# Patient Record
Sex: FEMALE | Race: WHITE | NOT HISPANIC OR LATINO | Employment: OTHER | URBAN - METROPOLITAN AREA
[De-identification: names, ages, dates, MRNs, and addresses within clinical notes are randomized per-mention and may not be internally consistent; named-entity substitution may affect disease eponyms.]

---

## 2017-01-11 ENCOUNTER — HOSPITAL ENCOUNTER (OUTPATIENT)
Dept: CARDIOLOGY | Facility: MEDICAL CENTER | Age: 72
End: 2017-01-11
Attending: INTERNAL MEDICINE
Payer: MEDICARE

## 2017-01-11 DIAGNOSIS — I20.9 ISCHEMIC CHEST PAIN (HCC): ICD-10-CM

## 2017-01-11 PROCEDURE — 93017 CV STRESS TEST TRACING ONLY: CPT

## 2017-01-11 PROCEDURE — A9555 RB82 RUBIDIUM: HCPCS

## 2017-01-11 PROCEDURE — 78492 MYOCRD IMG PET MLT RST&STRS: CPT

## 2017-01-11 PROCEDURE — 700111 HCHG RX REV CODE 636 W/ 250 OVERRIDE (IP)

## 2017-01-12 NOTE — PROCEDURES
REFERRING PHYSICIAN:  Fernando Tesfaye M.D.    AGE:  71.  GENDER:  Female.  HEIGHT:  5 feet 4 inches.  WEIGHT:  175.  BMI:    INDICATIONS:  Chest pain.    MEDICATIONS:    PROCEDURE:  The patient reviewed and signed the acknowledgement for testing   form.  The patient was in a fasting state and was properly prepared for   testing.  An intravenous line was inserted and a flush of normal saline   followed to insure line patency.    A transmission scan was acquired for attenuation correction using the internal   Germanium sources.  The patient was then administered 3.12 mCi of   Rubidium-82.  Approximately 90 seconds after the infusion, resting imagine   were obtained with ECG-gating.  Following the resting series, the patient   administered 45 mg of dipyridamole over four minutes.  The blood pressure,   heart rate and ECG were monitored and recorded.  After the dipyridamole   infusion was completed, another transmission scan for attenuation correction   was obtained.  The patient was then administered 3.0 mCi of Rubidium-82.    Approximately 90 seconds after the infusion, Peak stress images were obtained   with ECG-gating.    CLINICAL RESPONSE:  Resting blood pressure was 158/87 with a heart rate of 64.    Immediately post-dipyridamole infusion the blood pressure was 134/65 with a   heart rate of 93.  After a recovery period the blood pressure was 135/77 with   a heart rate of 91.    The patient experienced headache and jaw pain, no symptoms during testing.    Aminophylline 100 mg was administered following the scan.    ELECTROCARDIOGRAPHIC FINDINGS:  The patient has normal sinus rhythm at rest.    With dipyridamole infusion, there were no ischemic changes.  Occasional PVC   noted.    SCINTOGRAPHIC FINDINGS:  With both stress and rest imaging, there is normal   myocardial perfusion.  No evidence of ischemia or infarction.    GATED WALL MOTION FINDINGS:  By gated PET, there is normal regional wall   motion.  The  measured ejection fraction at rest is 78%.    CONCLUSIONS AND IMPRESSIONS:  Negative PET perfusion study for ischemia.    Normal resting ejection fraction.       ____________________________________     MD RITESH Valle / DILIP    DD:  01/11/2017 17:39:00  DT:  01/11/2017 18:48:40    D#:  [id number]  Job#:  [id number]    cc: TIBURCIO GAMEZ MD, XANDER HAINES MD

## 2017-01-16 ENCOUNTER — TELEPHONE (OUTPATIENT)
Dept: CARDIOLOGY | Facility: MEDICAL CENTER | Age: 72
End: 2017-01-16

## 2017-01-16 NOTE — TELEPHONE ENCOUNTER
NM-CARDIAC PET   Status: Final result     Visible to patient:  Not Released     Dx:  Ischemic chest pain (CMS-HCC)               Notes Recorded by Fernando Tesfaye M.D. on 1/13/2017 at 11:13 AM  Negative stress test, looks good  Notes Recorded by Kristi Vásquez R.N. on 1/13/2017 at 10:18 AM  F/V with ROSIE Santoyo th 18th     To discuss in follow up as planned 1/18

## 2017-01-16 NOTE — TELEPHONE ENCOUNTER
DONE    ----- Message from Fernando Tesfaye M.D. sent at 1/13/2017 11:13 AM PST -----  Negative stress test, looks good

## 2017-01-17 ENCOUNTER — TELEPHONE (OUTPATIENT)
Dept: CARDIOLOGY | Facility: MEDICAL CENTER | Age: 72
End: 2017-01-17

## 2017-01-17 NOTE — TELEPHONE ENCOUNTER
----- Message from RADHA Smyth sent at 1/13/2017 11:54 AM PST -----  Please let patient know that RO reviewed PET and is normal. FU as planned. SC  ----- Message -----     From: Kristi Vásquez R.N.     Sent: 1/13/2017  10:18 AM       To: Fernando Tesfaye M.D., #    F/V with ROSIE Santoyo th 18th

## 2017-01-18 ENCOUNTER — OFFICE VISIT (OUTPATIENT)
Dept: CARDIOLOGY | Facility: MEDICAL CENTER | Age: 72
End: 2017-01-18
Payer: MEDICARE

## 2017-01-18 VITALS
HEIGHT: 64 IN | WEIGHT: 174 LBS | OXYGEN SATURATION: 96 % | HEART RATE: 84 BPM | SYSTOLIC BLOOD PRESSURE: 126 MMHG | BODY MASS INDEX: 29.71 KG/M2 | DIASTOLIC BLOOD PRESSURE: 84 MMHG

## 2017-01-18 DIAGNOSIS — E78.00 PURE HYPERCHOLESTEROLEMIA: ICD-10-CM

## 2017-01-18 DIAGNOSIS — I20.9 ISCHEMIC CHEST PAIN (HCC): ICD-10-CM

## 2017-01-18 DIAGNOSIS — R94.31 PROLONGED Q-T INTERVAL ON ECG: ICD-10-CM

## 2017-01-18 DIAGNOSIS — I10 ESSENTIAL HYPERTENSION: ICD-10-CM

## 2017-01-18 PROCEDURE — 99214 OFFICE O/P EST MOD 30 MIN: CPT | Performed by: NURSE PRACTITIONER

## 2017-01-18 RX ORDER — CLARITHROMYCIN 500 MG/1
TABLET, COATED ORAL
Refills: 0 | COMMUNITY
Start: 2016-11-29 | End: 2017-01-18

## 2017-01-18 ASSESSMENT — ENCOUNTER SYMPTOMS
CLAUDICATION: 0
PALPITATIONS: 0
SHORTNESS OF BREATH: 0
DIZZINESS: 0
ABDOMINAL PAIN: 0
PND: 0
FEVER: 0
MYALGIAS: 0
COUGH: 0
ORTHOPNEA: 0

## 2017-01-18 NOTE — Clinical Note
[facility]   [address], [address]  [place] [address]  Phone: [phone]  Fax: [phone]              Tessa Oro  1945    Encounter Date: 1/18/2017    RADHA Smyth          PROGRESS NOTE:  Subjective:   Tessa Oro is a 71 y.o. female who presents today for follow up regarding stress imaging.    She is a patient of Dr. Tesfaye in our office. Hx of HTN, HLD, and atypical chest pains.    She is overall doing well. She continues to stay active with water aerobics daily. She has no chest discomfort with exertion but has some atypical chest tightness at rest that is very rare and lasts only a few seconds.    She has had no episodes of palpitations, dizziness/lightheadedness, shortness of breath, orthopnea, or peripheral edema.    Past Medical History   Diagnosis Date   • Hypertension    • Hyperlipidemia    • Fibromyalgia    • Hypothyroid    • GERD (gastroesophageal reflux disease)    • Urinary frequency      History reviewed. No pertinent past surgical history.  History reviewed. No pertinent family history.  History   Smoking status   • Former Smoker -- 0.50 packs/day for 15 years   • Types: Cigarettes   • Quit date: 12/16/1975   Smokeless tobacco   • Never Used     Allergies   Allergen Reactions   • Pcn [Penicillins] Hives   • Sulfa Drugs      Pt. States unsure of reactions and severity. Had the allergic reaction a while ago   • Tetracycline Hives     Outpatient Encounter Prescriptions as of 1/18/2017   Medication Sig Dispense Refill   • losartan (COZAAR) 100 MG Tab Take 100 mg by mouth every day.     • levothyroxine (SYNTHROID) 125 MCG Tab Take 125 mcg by mouth Every morning on an empty stomach.     • omeprazole (PRILOSEC) 20 MG delayed-release capsule Take 20 mg by mouth every day.     • oxybutynin SR (DITROPAN-XL) 10 MG CR tablet Take 10 mg by mouth every day.     • atorvastatin (LIPITOR) 40 MG Tab Take 40 mg by mouth  "every evening.     • cyclobenzaprine (FLEXERIL) 10 MG Tab Take 10 mg by mouth 3 times a day as needed.     • amlodipine (NORVASC) 2.5 MG Tab Take 2.5 mg by mouth every day.     • duloxetine (CYMBALTA) 30 MG Cap DR Particles Take 30 mg by mouth every day.     • [DISCONTINUED] clarithromycin (BIAXIN) 500 MG Tab TAKE 1 TABLET ORALLY TWICE DAILY WITH FOOD AND PROBIOTICS  0     No facility-administered encounter medications on file as of 1/18/2017.     Review of Systems   Constitutional: Negative for fever and malaise/fatigue.   Respiratory: Negative for cough and shortness of breath.    Cardiovascular: Positive for chest pain. Negative for palpitations, orthopnea, claudication, leg swelling and PND.        Chest tightness at rest but very rare   Gastrointestinal: Negative for abdominal pain.   Musculoskeletal: Negative for myalgias.   Neurological: Negative for dizziness.   All other systems reviewed and are negative.       Objective:   /84 mmHg  Pulse 84  Ht 1.626 m (5' 4.02\")  Wt 78.926 kg (174 lb)  BMI 29.85 kg/m2  SpO2 96%    Physical Exam   Constitutional: She is oriented to person, place, and time. She appears well-developed and well-nourished. No distress.   HENT:   Head: Normocephalic and atraumatic.   Eyes: EOM are normal.   Neck: Normal range of motion. No JVD present.   Cardiovascular: Normal rate, regular rhythm, normal heart sounds and intact distal pulses.    No murmur heard.  Pulmonary/Chest: Effort normal and breath sounds normal. No respiratory distress.   Abdominal: Soft. Bowel sounds are normal.   Musculoskeletal: Normal range of motion. She exhibits no edema.   Neurological: She is alert and oriented to person, place, and time.   Skin: Skin is warm and dry.   Psychiatric: She has a normal mood and affect.   Nursing note and vitals reviewed.    No results found for: CHOLSTRLTOT, LDL, HDL, TRIGLYCERIDE    No results found for: SODIUM, POTASSIUM, CHLORIDE, CO2, GLUCOSE, BUN, CREATININE, " BUNCREATRAT, GLOMRATE  No results found for: ALKPHOSPHAT, ASTSGOT, ALTSGPT, TBILIRUBIN     2017 PET SCAN CONCLUSIONS AND IMPRESSIONS:  Negative PET perfusion study for ischemia.     Normal resting ejection fraction.     LABS THROUGH LAB [facility]A  Assessment:     1. Essential hypertension     2. Pure hypercholesterolemia     3. Ischemic chest pain (CMS-HCC)     4. Prolonged Q-T interval on ECG       Medical Decision Making:  Today's Assessment / Status / Plan:     1. HTN, great control on losartan and amlodipine. Continue management with PCP.    2. HLD, great control with lipitor. Continue management with PCP.    3. Chest pain, atypical and now with negative PET scan. Re-assured patient that this is most likely not cardiac related and to follow up with PCP if continues for other causes. She is to continue ASA and lipitor.    4. Prolonged QT, not identified by 2 EKG's at last apt by Dr. Tesfaye.     FU in clinic PRN for any cardiac concerns    Patient verbalizes understanding and agrees with the plan of care.     Collaborating MD: Best HENRY    Spent 45 minutes in face-to-face patient contact in which greater than 50% of the visit was spent in counseling/coordination of care of medication management, symptom management, re-assurance, discussion of chest pain and negative PET scan.        No Recipients

## 2017-01-18 NOTE — MR AVS SNAPSHOT
"        Tessa Hoffmanon   2017 1:20 PM   Office Visit   MRN: [id number]    Department:  [facility]   Dept Phone:  [phone]    Description:  Female : 1945   Provider:  RADHA Smyth           Reason for Visit     Follow-Up PP of RO       Allergies as of 2017     Allergen Noted Reactions    Pcn [Penicillins] 2016   Hives    Sulfa Drugs 2016       Pt. States unsure of reactions and severity. Had the allergic reaction a while ago    Tetracycline 2016   Hives      You were diagnosed with     Essential hypertension   [4468510]       Pure hypercholesterolemia   [272.0.ICD-9-CM]       Ischemic chest pain (CMS-HCC)   [360908]       Prolonged Q-T interval on ECG   [491586]         Vital Signs     Blood Pressure Pulse Height Weight Body Mass Index Oxygen Saturation    126/84 mmHg 84 1.626 m (5' 4.02\") 78.926 kg (174 lb) 29.85 kg/m2 96%    Smoking Status                   Former Smoker           Basic Information     Date Of Birth Sex Race Ethnicity Preferred Language    1945 Female White Non- English      Problem List              ICD-10-CM Priority Class Noted - Resolved    Prolonged Q-T interval on ECG R94.31 Medium  2016 - Present    Essential hypertension I10 Medium  2016 - Present    Pure hypercholesterolemia E78.00 Medium  2016 - Present    Ischemic chest pain (CMS-HCC) I20.9 Medium  2016 - Present      Health Maintenance        Date Due Completion Dates    IMM DTaP/Tdap/Td Vaccine (1 - Tdap) 1964 ---    PAP SMEAR 1966 ---    MAMMOGRAM 1985 ---    COLONOSCOPY 1995 ---    IMM ZOSTER VACCINE 2005 ---    BONE DENSITY 2010 ---    IMM PNEUMOCOCCAL 65+ (ADULT) LOW/MEDIUM RISK SERIES (1 of 2 - PCV13) 2010 ---    IMM INFLUENZA (1) 2016 ---            Current Immunizations     No immunizations on file.      Below and/or attached are the medications your provider expects you to take. Review " all of your home medications and newly ordered medications with your provider and/or pharmacist. Follow medication instructions as directed by your provider and/or pharmacist. Please keep your medication list with you and share with your provider. Update the information when medications are discontinued, doses are changed, or new medications (including over-the-counter products) are added; and carry medication information at all times in the event of emergency situations     Allergies:  PCN - Hives     SULFA DRUGS - (reactions not documented)     TETRACYCLINE - Hives               Medications  Valid as of: January 18, 2017 -  1:39 PM    Generic Name Brand Name Tablet Size Instructions for use    AmLODIPine Besylate (Tab) NORVASC 2.5 MG Take 2.5 mg by mouth every day.        Atorvastatin Calcium (Tab) LIPITOR 40 MG Take 40 mg by mouth every evening.        Cyclobenzaprine HCl (Tab) FLEXERIL 10 MG Take 10 mg by mouth 3 times a day as needed.        DULoxetine HCl (Cap DR Particles) CYMBALTA 30 MG Take 30 mg by mouth every day.        Levothyroxine Sodium (Tab) SYNTHROID 125 MCG Take 125 mcg by mouth Every morning on an empty stomach.        Losartan Potassium (Tab) COZAAR 100 MG Take 100 mg by mouth every day.        Omeprazole (CAPSULE DELAYED RELEASE) PRILOSEC 20 MG Take 20 mg by mouth every day.        Oxybutynin Chloride (TABLET SR 24 HR) DITROPAN-XL 10 MG Take 10 mg by mouth every day.        .                 Medicines prescribed today were sent to:     "[facility] [address] [place] [address] & [address] [address]    Phone: [phone] Fax: [phone]    Open 24 Hours?: No      Medication refill instructions:       If your prescription bottle indicates you have medication refills left, it is not necessary to call your provider’s office. Please contact your pharmacy and they will refill your medication.    If your prescription bottle indicates  you do not have any refills left, you may request refills at any time through one of the following ways: The online Nutrinsic system (except Urgent Care), by calling your provider’s office, or by asking your pharmacy to contact your provider’s office with a refill request. Medication refills are processed only during regular business hours and may not be available until the next business day. Your provider may request additional information or to have a follow-up visit with you prior to refilling your medication.   *Please Note: Medication refills are assigned a new Rx number when refilled electronically. Your pharmacy may indicate that no refills were authorized even though a new prescription for the same medication is available at the pharmacy. Please request the medicine by name with the pharmacy before contacting your provider for a refill.           Nutrinsic Access Code: [email or web address]  Expires: 2/17/2017  1:39 PM    Nutrinsic  A secure, online tool to manage your health information     [employer]’s Nutrinsic® is a secure, online tool that connects you to your personalized health information from the privacy of your home -- day or night - making it very easy for you to manage your healthcare. Once the activation process is completed, you can even access your medical information using the Nutrinsic erna, which is available for free in the Apple Erna store or Google Play store.     Nutrinsic provides the following levels of access (as shown below):   My Chart Features   [facility] Doctor [facility]  Urgent  Care Non-[facility]  Primary Care  Doctor   Email your healthcare team securely and privately 24/7 X X X    Manage appointments: schedule your next appointment; view details of past/upcoming appointments X      Request prescription refills. X      View recent personal medical records, including lab and immunizations X X X X   View health record, including health history, allergies, medications X  X X X   Read reports about your outpatient visits, procedures, consult and ER notes X X X X   See your discharge summary, which is a recap of your hospital and/or ER visit that includes your diagnosis, lab results, and care plan. X X       How to register for EcoSynth:  1. Go to  [email or web address].  2. Click on the Sign Up Now box, which takes you to the New Member Sign Up page. You will need to provide the following information:  a. Enter your EcoSynth Access Code exactly as it appears at the top of this page. (You will not need to use this code after you’ve completed the sign-up process. If you do not sign up before the expiration date, you must request a new code.)   b. Enter your date of birth.   c. Enter your home email address.   d. Click Submit, and follow the next screen’s instructions.  3. Create a EcoSynth ID. This will be your EcoSynth login ID and cannot be changed, so think of one that is secure and easy to remember.  4. Create a EcoSynth password. You can change your password at any time.  5. Enter your Password Reset Question and Answer. This can be used at a later time if you forget your password.   6. Enter your e-mail address. This allows you to receive e-mail notifications when new information is available in EcoSynth.  7. Click Sign Up. You can now view your health information.    For assistance activating your EcoSynth account, call [phone]

## 2017-01-18 NOTE — PROGRESS NOTES
Subjective:   Tessa Oro is a 71 y.o. female who presents today for follow up regarding stress imaging.    She is a patient of Dr. Tesfaye in our office. Hx of HTN, HLD, and atypical chest pains.    She is overall doing well. She continues to stay active with water aerobics daily. She has no chest discomfort with exertion but has some atypical chest tightness at rest that is very rare and lasts only a few seconds.    She has had no episodes of palpitations, dizziness/lightheadedness, shortness of breath, orthopnea, or peripheral edema.    Past Medical History   Diagnosis Date   • Hypertension    • Hyperlipidemia    • Fibromyalgia    • Hypothyroid    • GERD (gastroesophageal reflux disease)    • Urinary frequency      History reviewed. No pertinent past surgical history.  History reviewed. No pertinent family history.  History   Smoking status   • Former Smoker -- 0.50 packs/day for 15 years   • Types: Cigarettes   • Quit date: 12/16/1975   Smokeless tobacco   • Never Used     Allergies   Allergen Reactions   • Pcn [Penicillins] Hives   • Sulfa Drugs      Pt. States unsure of reactions and severity. Had the allergic reaction a while ago   • Tetracycline Hives     Outpatient Encounter Prescriptions as of 1/18/2017   Medication Sig Dispense Refill   • losartan (COZAAR) 100 MG Tab Take 100 mg by mouth every day.     • levothyroxine (SYNTHROID) 125 MCG Tab Take 125 mcg by mouth Every morning on an empty stomach.     • omeprazole (PRILOSEC) 20 MG delayed-release capsule Take 20 mg by mouth every day.     • oxybutynin SR (DITROPAN-XL) 10 MG CR tablet Take 10 mg by mouth every day.     • atorvastatin (LIPITOR) 40 MG Tab Take 40 mg by mouth every evening.     • cyclobenzaprine (FLEXERIL) 10 MG Tab Take 10 mg by mouth 3 times a day as needed.     • amlodipine (NORVASC) 2.5 MG Tab Take 2.5 mg by mouth every day.     • duloxetine (CYMBALTA) 30 MG Cap DR Particles Take 30 mg by mouth every day.     • [DISCONTINUED]  "clarithromycin (BIAXIN) 500 MG Tab TAKE 1 TABLET ORALLY TWICE DAILY WITH FOOD AND PROBIOTICS  0     No facility-administered encounter medications on file as of 1/18/2017.     Review of Systems   Constitutional: Negative for fever and malaise/fatigue.   Respiratory: Negative for cough and shortness of breath.    Cardiovascular: Positive for chest pain. Negative for palpitations, orthopnea, claudication, leg swelling and PND.        Chest tightness at rest but very rare   Gastrointestinal: Negative for abdominal pain.   Musculoskeletal: Negative for myalgias.   Neurological: Negative for dizziness.   All other systems reviewed and are negative.       Objective:   /84 mmHg  Pulse 84  Ht 1.626 m (5' 4.02\")  Wt 78.926 kg (174 lb)  BMI 29.85 kg/m2  SpO2 96%    Physical Exam   Constitutional: She is oriented to person, place, and time. She appears well-developed and well-nourished. No distress.   HENT:   Head: Normocephalic and atraumatic.   Eyes: EOM are normal.   Neck: Normal range of motion. No JVD present.   Cardiovascular: Normal rate, regular rhythm, normal heart sounds and intact distal pulses.    No murmur heard.  Pulmonary/Chest: Effort normal and breath sounds normal. No respiratory distress.   Abdominal: Soft. Bowel sounds are normal.   Musculoskeletal: Normal range of motion. She exhibits no edema.   Neurological: She is alert and oriented to person, place, and time.   Skin: Skin is warm and dry.   Psychiatric: She has a normal mood and affect.   Nursing note and vitals reviewed.    No results found for: CHOLSTRLTOT, LDL, HDL, TRIGLYCERIDE    No results found for: SODIUM, POTASSIUM, CHLORIDE, CO2, GLUCOSE, BUN, CREATININE, BUNCREATRAT, GLOMRATE  No results found for: ALKPHOSPHAT, ASTSGOT, ALTSGPT, TBILIRUBIN     2017 PET SCAN CONCLUSIONS AND IMPRESSIONS:  Negative PET perfusion study for ischemia.     Normal resting ejection fraction.     LABS THROUGH LAB [facility]ITHA  Assessment:     1. Essential " hypertension     2. Pure hypercholesterolemia     3. Ischemic chest pain (CMS-HCC)     4. Prolonged Q-T interval on ECG       Medical Decision Making:  Today's Assessment / Status / Plan:     1. HTN, great control on losartan and amlodipine. Continue management with PCP.    2. HLD, great control with lipitor. Continue management with PCP.    3. Chest pain, atypical and now with negative PET scan. Re-assured patient that this is most likely not cardiac related and to follow up with PCP if continues for other causes. She is to continue ASA and lipitor.    4. Prolonged QT, not identified by 2 EKG's at last apt by Dr. Tesfaye.     FU in clinic PRN for any cardiac concerns    Patient verbalizes understanding and agrees with the plan of care.     Collaborating MD: Best HENRY    Spent 45 minutes in face-to-face patient contact in which greater than 50% of the visit was spent in counseling/coordination of care of medication management, symptom management, re-assurance, discussion of chest pain and negative PET scan.

## 2017-11-14 ENCOUNTER — APPOINTMENT (RX ONLY)
Dept: URBAN - METROPOLITAN AREA CLINIC 4 | Facility: CLINIC | Age: 72
Setting detail: DERMATOLOGY
End: 2017-11-14

## 2017-11-14 DIAGNOSIS — L82.1 OTHER SEBORRHEIC KERATOSIS: ICD-10-CM

## 2017-11-14 DIAGNOSIS — L81.4 OTHER MELANIN HYPERPIGMENTATION: ICD-10-CM

## 2017-11-14 DIAGNOSIS — D17 BENIGN LIPOMATOUS NEOPLASM: ICD-10-CM

## 2017-11-14 DIAGNOSIS — D22 MELANOCYTIC NEVI: ICD-10-CM

## 2017-11-14 DIAGNOSIS — D18.0 HEMANGIOMA: ICD-10-CM

## 2017-11-14 PROBLEM — D22.9 MELANOCYTIC NEVI, UNSPECIFIED: Status: ACTIVE | Noted: 2017-11-14

## 2017-11-14 PROBLEM — I10 ESSENTIAL (PRIMARY) HYPERTENSION: Status: ACTIVE | Noted: 2017-11-14

## 2017-11-14 PROBLEM — E78.5 HYPERLIPIDEMIA, UNSPECIFIED: Status: ACTIVE | Noted: 2017-11-14

## 2017-11-14 PROBLEM — D18.01 HEMANGIOMA OF SKIN AND SUBCUTANEOUS TISSUE: Status: ACTIVE | Noted: 2017-11-14

## 2017-11-14 PROBLEM — E03.9 HYPOTHYROIDISM, UNSPECIFIED: Status: ACTIVE | Noted: 2017-11-14

## 2017-11-14 PROBLEM — D17.21 BENIGN LIPOMATOUS NEOPLASM OF SKIN AND SUBCUTANEOUS TISSUE OF RIGHT ARM: Status: ACTIVE | Noted: 2017-11-14

## 2017-11-14 PROCEDURE — 99203 OFFICE O/P NEW LOW 30 MIN: CPT

## 2017-11-14 PROCEDURE — ? COUNSELING

## 2017-11-14 ASSESSMENT — LOCATION ZONE DERM
LOCATION ZONE: ARM
LOCATION ZONE: LIP

## 2017-11-14 ASSESSMENT — LOCATION SIMPLE DESCRIPTION DERM
LOCATION SIMPLE: RIGHT SHOULDER
LOCATION SIMPLE: LEFT LIP

## 2017-11-14 ASSESSMENT — LOCATION DETAILED DESCRIPTION DERM
LOCATION DETAILED: LEFT SUPERIOR VERMILION LIP
LOCATION DETAILED: RIGHT POSTERIOR SHOULDER

## 2017-12-20 ENCOUNTER — HOSPITAL ENCOUNTER (OUTPATIENT)
Dept: HOSPITAL 8 - CFH | Age: 72
Discharge: HOME | End: 2017-12-20
Attending: FAMILY MEDICINE
Payer: MEDICARE

## 2017-12-20 DIAGNOSIS — Z12.31: Primary | ICD-10-CM

## 2017-12-20 DIAGNOSIS — N80.9: ICD-10-CM

## 2017-12-20 PROCEDURE — 77063 BREAST TOMOSYNTHESIS BI: CPT

## 2017-12-20 PROCEDURE — G0202 SCR MAMMO BI INCL CAD: HCPCS

## 2017-12-25 ENCOUNTER — APPOINTMENT (OUTPATIENT)
Dept: RADIOLOGY | Facility: MEDICAL CENTER | Age: 72
End: 2017-12-25
Attending: EMERGENCY MEDICINE
Payer: MEDICARE

## 2017-12-25 ENCOUNTER — HOSPITAL ENCOUNTER (OUTPATIENT)
Facility: MEDICAL CENTER | Age: 72
End: 2017-12-25
Attending: EMERGENCY MEDICINE | Admitting: HOSPITALIST
Payer: MEDICARE

## 2017-12-25 ENCOUNTER — RESOLUTE PROFESSIONAL BILLING HOSPITAL PROF FEE (OUTPATIENT)
Dept: HOSPITALIST | Facility: MEDICAL CENTER | Age: 72
End: 2017-12-25
Payer: MEDICARE

## 2017-12-25 VITALS
OXYGEN SATURATION: 97 % | BODY MASS INDEX: 29.88 KG/M2 | DIASTOLIC BLOOD PRESSURE: 72 MMHG | SYSTOLIC BLOOD PRESSURE: 111 MMHG | TEMPERATURE: 98.1 F | HEART RATE: 77 BPM | HEIGHT: 64 IN | WEIGHT: 175 LBS | RESPIRATION RATE: 8 BRPM

## 2017-12-25 DIAGNOSIS — R07.2 PRECORDIAL PAIN: ICD-10-CM

## 2017-12-25 PROBLEM — R07.9 CHEST PAIN: Status: RESOLVED | Noted: 2017-12-25 | Resolved: 2017-12-25

## 2017-12-25 PROBLEM — R11.2 NAUSEA AND VOMITING: Status: ACTIVE | Noted: 2017-12-25

## 2017-12-25 PROBLEM — R11.2 NAUSEA AND VOMITING: Status: RESOLVED | Noted: 2017-12-25 | Resolved: 2017-12-25

## 2017-12-25 PROBLEM — R07.9 CHEST PAIN: Status: ACTIVE | Noted: 2017-12-25

## 2017-12-25 LAB
ALBUMIN SERPL BCP-MCNC: 4.1 G/DL (ref 3.2–4.9)
ALBUMIN/GLOB SERPL: 1.4 G/DL
ALP SERPL-CCNC: 32 U/L (ref 30–99)
ALT SERPL-CCNC: 19 U/L (ref 2–50)
ANION GAP SERPL CALC-SCNC: 10 MMOL/L (ref 0–11.9)
APTT PPP: 25.1 SEC (ref 24.7–36)
AST SERPL-CCNC: 20 U/L (ref 12–45)
BASOPHILS # BLD AUTO: 0.3 % (ref 0–1.8)
BASOPHILS # BLD: 0.03 K/UL (ref 0–0.12)
BILIRUB SERPL-MCNC: 0.7 MG/DL (ref 0.1–1.5)
BNP SERPL-MCNC: 18 PG/ML (ref 0–100)
BUN SERPL-MCNC: 16 MG/DL (ref 8–22)
CALCIUM SERPL-MCNC: 9 MG/DL (ref 8.5–10.5)
CHLORIDE SERPL-SCNC: 105 MMOL/L (ref 96–112)
CO2 SERPL-SCNC: 20 MMOL/L (ref 20–33)
CREAT SERPL-MCNC: 0.87 MG/DL (ref 0.5–1.4)
EKG IMPRESSION: NORMAL
EOSINOPHIL # BLD AUTO: 0.01 K/UL (ref 0–0.51)
EOSINOPHIL NFR BLD: 0.1 % (ref 0–6.9)
ERYTHROCYTE [DISTWIDTH] IN BLOOD BY AUTOMATED COUNT: 40.4 FL (ref 35.9–50)
GFR SERPL CREATININE-BSD FRML MDRD: >60 ML/MIN/1.73 M 2
GLOBULIN SER CALC-MCNC: 3 G/DL (ref 1.9–3.5)
GLUCOSE SERPL-MCNC: 131 MG/DL (ref 65–99)
HCT VFR BLD AUTO: 41.7 % (ref 37–47)
HGB BLD-MCNC: 14.2 G/DL (ref 12–16)
IMM GRANULOCYTES # BLD AUTO: 0.03 K/UL (ref 0–0.11)
IMM GRANULOCYTES NFR BLD AUTO: 0.3 % (ref 0–0.9)
INR PPP: 1.05 (ref 0.87–1.13)
LIPASE SERPL-CCNC: 11 U/L (ref 11–82)
LYMPHOCYTES # BLD AUTO: 0.77 K/UL (ref 1–4.8)
LYMPHOCYTES NFR BLD: 7.4 % (ref 22–41)
MCH RBC QN AUTO: 29.7 PG (ref 27–33)
MCHC RBC AUTO-ENTMCNC: 34.1 G/DL (ref 33.6–35)
MCV RBC AUTO: 87.2 FL (ref 81.4–97.8)
MONOCYTES # BLD AUTO: 0.46 K/UL (ref 0–0.85)
MONOCYTES NFR BLD AUTO: 4.4 % (ref 0–13.4)
NEUTROPHILS # BLD AUTO: 9.16 K/UL (ref 2–7.15)
NEUTROPHILS NFR BLD: 87.5 % (ref 44–72)
NRBC # BLD AUTO: 0 K/UL
NRBC BLD-RTO: 0 /100 WBC
PLATELET # BLD AUTO: 354 K/UL (ref 164–446)
PMV BLD AUTO: 9.3 FL (ref 9–12.9)
POTASSIUM SERPL-SCNC: 3.5 MMOL/L (ref 3.6–5.5)
PROT SERPL-MCNC: 7.1 G/DL (ref 6–8.2)
PROTHROMBIN TIME: 13.4 SEC (ref 12–14.6)
RBC # BLD AUTO: 4.78 M/UL (ref 4.2–5.4)
SODIUM SERPL-SCNC: 135 MMOL/L (ref 135–145)
TROPONIN I SERPL-MCNC: <0.01 NG/ML (ref 0–0.04)
TROPONIN I SERPL-MCNC: <0.01 NG/ML (ref 0–0.04)
WBC # BLD AUTO: 10.5 K/UL (ref 4.8–10.8)

## 2017-12-25 PROCEDURE — 85025 COMPLETE CBC W/AUTO DIFF WBC: CPT

## 2017-12-25 PROCEDURE — 93005 ELECTROCARDIOGRAM TRACING: CPT | Performed by: EMERGENCY MEDICINE

## 2017-12-25 PROCEDURE — 71010 DX-CHEST-PORTABLE (1 VIEW): CPT

## 2017-12-25 PROCEDURE — 99285 EMERGENCY DEPT VISIT HI MDM: CPT

## 2017-12-25 PROCEDURE — 80053 COMPREHEN METABOLIC PANEL: CPT

## 2017-12-25 PROCEDURE — 36415 COLL VENOUS BLD VENIPUNCTURE: CPT

## 2017-12-25 PROCEDURE — 85730 THROMBOPLASTIN TIME PARTIAL: CPT

## 2017-12-25 PROCEDURE — 83880 ASSAY OF NATRIURETIC PEPTIDE: CPT

## 2017-12-25 PROCEDURE — 84484 ASSAY OF TROPONIN QUANT: CPT

## 2017-12-25 PROCEDURE — G0378 HOSPITAL OBSERVATION PER HR: HCPCS

## 2017-12-25 PROCEDURE — 83690 ASSAY OF LIPASE: CPT

## 2017-12-25 PROCEDURE — 93005 ELECTROCARDIOGRAM TRACING: CPT

## 2017-12-25 PROCEDURE — 85610 PROTHROMBIN TIME: CPT

## 2017-12-25 PROCEDURE — 99220 PR INITIAL OBSERVATION CARE,LEVL III: CPT | Performed by: HOSPITALIST

## 2017-12-25 RX ORDER — SODIUM CHLORIDE 9 MG/ML
INJECTION, SOLUTION INTRAVENOUS CONTINUOUS
Status: DISCONTINUED | OUTPATIENT
Start: 2017-12-25 | End: 2017-12-25 | Stop reason: HOSPADM

## 2017-12-25 ASSESSMENT — ENCOUNTER SYMPTOMS
PALPITATIONS: 0
COUGH: 0
NAUSEA: 1
DIARRHEA: 0
FOCAL WEAKNESS: 0
CONSTIPATION: 0
ABDOMINAL PAIN: 1
DIZZINESS: 0
MYALGIAS: 0
CHILLS: 0
WEAKNESS: 0
FEVER: 0
SHORTNESS OF BREATH: 0
HEADACHES: 0
VOMITING: 1

## 2017-12-25 ASSESSMENT — PAIN SCALES - GENERAL: PAINLEVEL_OUTOF10: 3

## 2017-12-25 NOTE — ASSESSMENT & PLAN NOTE
This is resolved now. I suspect she may have had some bad food. Her labs are benign. I do not a high concern for a life-threatening infection, dissection, cholecystitis, acute MI.

## 2017-12-25 NOTE — ED NOTES
MD Head at bed for consult/ POC to d/c pt post neg Troponin/ekg. Pt aware of POC. Pt given discharge instructions/ home care instructions explained/ pt understands the need for follow up, pt verbalized understanding of instructions given, pt ambulatory to [facility].

## 2017-12-25 NOTE — CONSULTS
[facility] Medicine ER Consultation    Date of Service  12/25/2017    Reason for Consultation  Chest pain     History of Presenting Illness  72 y.o. female who presented 12/25/2017 with chest pain that started yesterday afternoon. She had gone to [facility], but then she started having some nausea and vomiting. Her chest pain started after she had been vomiting several times. She describes her chest pain is left upper quadrant of the abdomen and left lower chest, that was sore and crampy. This pain lasted approximately 6 hours and was constant. It is completely resolved at the moment her nausea is also completely resolved. On reviewing her records, she had a normal stress test on January 11, 2017. She also had an EKG done in December of last year, which I compared this EKG, and is essentially unchanged except for some flipped T waves in lead II.    Referring Physician  Billy Swanson M.D.    Consulting Physician  Kedar Head M.D.    Review of Systems  Review of Systems   Constitutional: Negative for chills and fever.   Respiratory: Negative for cough and shortness of breath.    Cardiovascular: Positive for chest pain. Negative for palpitations.   Gastrointestinal: Positive for abdominal pain, nausea and vomiting. Negative for constipation and diarrhea.   Genitourinary: Negative for dysuria.   Musculoskeletal: Negative for myalgias.   Skin: Negative for itching.   Neurological: Negative for dizziness, focal weakness, weakness and headaches.   All other systems reviewed and are negative.     Past Medical History  Past Medical History:   Diagnosis Date   • Fibromyalgia    • GERD (gastroesophageal reflux disease)    • Hyperlipidemia    • Hypertension    • Hypothyroid    • Urinary frequency        Surgical History  No past surgical history on file.    Medications  No current facility-administered medications on file prior to encounter.      Current Outpatient Prescriptions on File Prior to Encounter   Medication  Sig Dispense Refill   • losartan (COZAAR) 100 MG Tab Take 100 mg by mouth every day.     • levothyroxine (SYNTHROID) 125 MCG Tab Take 125 mcg by mouth Every morning on an empty stomach.     • omeprazole (PRILOSEC) 20 MG delayed-release capsule Take 20 mg by mouth every day.     • oxybutynin SR (DITROPAN-XL) 10 MG CR tablet Take 10 mg by mouth every day.     • atorvastatin (LIPITOR) 40 MG Tab Take 40 mg by mouth every evening.     • cyclobenzaprine (FLEXERIL) 10 MG Tab Take 10 mg by mouth 3 times a day as needed.     • amlodipine (NORVASC) 2.5 MG Tab Take 2.5 mg by mouth every day.     • duloxetine (CYMBALTA) 30 MG Cap DR Particles Take 30 mg by mouth every day.         Family History  No heart problems that she knows of    Social History  Social History   Substance Use Topics   • Smoking status: Former Smoker     Packs/day: 0.50     Years: 15.00     Types: Cigarettes     Quit date: 1975   • Smokeless tobacco: Never Used   • Alcohol use Yes       Allergies  Allergies   Allergen Reactions   • Pcn [Penicillins] Hives   • Sulfa Drugs      Pt. States unsure of reactions and severity. Had the allergic reaction a while ago   • Tetracycline Hives        Physical Exam  Laboratory/Imaging   Hemodynamics  Temp (24hrs), Av.7 °C (98.1 °F), Min:36.7 °C (98.1 °F), Max:36.7 °C (98.1 °F)   Temperature: 36.7 °C (98.1 °F)  Pulse  Av  Min: 71  Max: 83 Heart Rate (Monitored): 75  Blood Pressure : 111/72, NIBP: 100/68      Respiratory      Respiration: 17, Pulse Oximetry: 91 %             Fluids       Nutrition  No orders of the defined types were placed in this encounter.      Physical Exam   Constitutional: She is oriented to person, place, and time. She appears well-developed and well-nourished. No distress.   HENT:   Head: Normocephalic and atraumatic.   Mouth/Throat: Oropharynx is clear and moist.   Eyes: Conjunctivae and EOM are normal. Pupils are equal, round, and reactive to light. No scleral icterus.   Neck:  Normal range of motion. Neck supple. No tracheal deviation present. No thyromegaly present.   Cardiovascular: Normal rate, regular rhythm, normal heart sounds and intact distal pulses.    No murmur heard.  Pulmonary/Chest: Effort normal and breath sounds normal. No respiratory distress. She has no wheezes. She has no rales.   Abdominal: Soft. Bowel sounds are normal. She exhibits no distension. There is no tenderness.   Musculoskeletal: Normal range of motion. She exhibits no edema or tenderness.   Lymphadenopathy:     She has no cervical adenopathy.        Right: No supraclavicular adenopathy present.        Left: No supraclavicular adenopathy present.   Neurological: She is alert and oriented to person, place, and time. No cranial nerve deficit.   Skin: Skin is warm and dry.   Vitals reviewed.      Recent Labs      12/25/17   0543   WBC  10.5   RBC  4.78   HEMOGLOBIN  14.2   HEMATOCRIT  41.7   MCV  87.2   MCH  29.7   MCHC  34.1   RDW  40.4   PLATELETCT  354   MPV  9.3     Recent Labs      12/25/17   0543   SODIUM  135   POTASSIUM  3.5*   CHLORIDE  105   CO2  20   GLUCOSE  131*   BUN  16   CREATININE  0.87   CALCIUM  9.0     Recent Labs      12/25/17   0543   APTT  25.1   INR  1.05     Recent Labs      12/25/17   0543   BNPBTYPENAT  18           I personally reviewed her EKG. It shows normal sinus rhythm. There are some flipped T waves in lead 2. Compared to last year's EKG it is essentially the same.      She had a normal stress test in January 2017    Assessment/Plan     Chest pain- (present on admission)   Assessment & Plan    This is completely resolved at the moment. I suspect this is all secondary to her nausea, vomiting, and retching. Her 1st troponin is negative. Her EKG is unchanged from last year. Her stress test 11 months ago was normal. I have asked her to follow-up with her primary care physician, Jada Alan. She already has to get labs for Dr. Alan anyway. She is very anxious for discharge.  I recommend discharging to home with close outpatient follow-up after her 2 hour troponin is negative, given her unchanged EKG and normal stress test results from last year.        Nausea and vomiting- (present on admission)   Assessment & Plan    This is resolved now. I suspect she may have had some bad food. Her labs are benign. I do not a high concern for a life-threatening infection, dissection, cholecystitis, acute MI.        Essential hypertension- (present on admission)   Assessment & Plan    Most recent Blood Pressure : 111/72   She is advised to continue her home medications with her normal doses.          I spent a total of 75 minutes during this clinical encounter of which > 50% was devoted to counseling and coordinating care including review of records, pertinent lab data and studies, as well as discussing diagnostic evaluation and work up, planned therapeutic interventions and future disposition of care. Where indicated, the assessment and plan reflect discussion of patient with consultants, other healthcare providers, family members, and additional research needed to obtain further information in formulating the plan of care of this patient.

## 2017-12-25 NOTE — DISCHARGE PLANNING
Care Transition Team Assessment    Information Source  Orientation : Oriented x 4  Information Given By: Patient  Informant's Name:  (Tessa)  Who is responsible for making decisions for patient? : Patient    Readmission Evaluation  Is this a readmission?: No    Elopement Risk  Legal Hold: No  Ambulatory or Self Mobile in Wheelchair: No-Not an Elopement Risk    Interdisciplinary Discharge Planning  Does Admitting Nurse Feel This Could be a Complex Discharge?: No  Primary Care Physician:  (Jada Alan MD)  Lives with - Patient's Self Care Capacity: Spouse  Support Systems: Family Member(s), Spouse / Significant Other  Housing / Facility: [address] [place] [facility]  Do You Take your Prescribed Medications Regularly: Yes  Able to Return to Previous ADL's: Yes  Mobility Issues: No  Prior Services: None  Patient Expects to be Discharged to::  (Home)  Assistance Needed: No  Durable Medical Equipment: Not Applicable    Discharge Preparedness  Prior Functional Level: Ambulatory, Drives Self, Independent with Activities of Daily Living    Functional Assesment  Prior Functional Level: Ambulatory, Drives Self, Independent with Activities of Daily Living    Finances  Financial Barriers to Discharge: No  Prescription Coverage: Yes    Vision / Hearing Impairment  Vision Impairment : No  Hearing Impairment : No    Values / Beliefs / Concerns  Values / Beliefs Concerns : No    Advance Directive  Advance Directive?: DPOA for Health Care  Durable Power of  Name and Contact :  (Srini (spouse))    Domestic Abuse  Have you ever been the victim of abuse or violence?: No    Psychological Assessment  History of Substance Abuse: None  History of Psychiatric Problems: No    Discharge Risks or Barriers  Discharge risks or barriers?: No    Anticipated Discharge Information  Anticipated discharge disposition: Home  Discharge Address:  ([address] [place] [address])  Discharge Contact Phone Number:  (Srini (spouse) [phone])

## 2017-12-25 NOTE — ASSESSMENT & PLAN NOTE
Most recent Blood Pressure : 111/72   She is advised to continue her home medications with her normal doses.

## 2017-12-25 NOTE — ED NOTES
"Tessa Oro      Chief Complaint   Patient presents with   • Chest Pain     midline radiates to back   • N/V       Pt BIB [facility] [place].  Pt had burger Misael yesterday around 130 pm and started to vomit around 4 pm.  Projectile vomiting x4 episodes.  Denies diarrhea.  After the vomiting episodes, pt started to have midline chest pain radiating to back, jaw and left shoulder.  PMH: HTN, hyperlipidemia, hypothyroidism, and depression.  Pt received 324 ASA, 4 nitro, zofran.  Pt currently reports 3/10 CP and denies nausea.    Blood Pressure : 111/72, Pulse: 79, Respiration: 18, Temperature: 36.7 °C (98.1 °F), Height: 162.6 cm (5' 4\"), Weight: 79.4 kg (175 lb), BMI (Calculated): 30.04, BSA (Calculated): 1.9, Pulse Oximetry: 97 %, O2 Delivery: None (Room Air)     "

## 2017-12-25 NOTE — ASSESSMENT & PLAN NOTE
This is completely resolved at the moment. I suspect this is all secondary to her nausea, vomiting, and retching. Her 1st troponin is negative. Her EKG is unchanged from last year. Her stress test 11 months ago was normal. I have asked her to follow-up with her primary care physician, Jada Alan. She already has to get labs for Dr. Alan anyway. She is very anxious for discharge. I recommend discharging to home with close outpatient follow-up after her 2 hour troponin is negative, given her unchanged EKG and normal stress test results from last year.

## 2017-12-25 NOTE — ED PROVIDER NOTES
"ED Provider Note    CHIEF COMPLAINT  Chief Complaint   Patient presents with   • Chest Pain     midline radiates to back   • N/V       HPI  Tessa Oro is a 72 y.o. female who presents complaining of chest pain. The patient began yesterday evening with some vomiting. She ate some Burger Misael and it does not sit well with her. No suspicious foods otherwise. Her  as well didn't feel particularly good but did not have the same symptoms as her. She threw up through the night. However brings her to the hospital she started developing chest pain. She describes a pressure sensation in her chest \"something just squeezing\" type of sensation. This radiates to her neck/jaw as well as to her left shoulder. She was given aspirin, Zofran, 3 rounds of nitroglycerin, and she now feels better. She denies any shortness of breath. No belly pain. She gets some pain like this intermittently but never anything this bad, it sounds. There is been no belly pain. No change in bowel or bladder. No leg pain or swelling. There is no other complaint. The patient had a cardiac PET scan in January of this year, which was apparently unremarkable.    PAST MEDICAL HISTORY  Past Medical History:   Diagnosis Date   • Fibromyalgia    • GERD (gastroesophageal reflux disease)    • Hyperlipidemia    • Hypertension    • Hypothyroid    • Urinary frequency        FAMILY HISTORY  No family history on file.    SOCIAL HISTORY  Social History   Substance Use Topics   • Smoking status: Former Smoker     Packs/day: 0.50     Years: 15.00     Types: Cigarettes     Quit date: 12/16/1975   • Smokeless tobacco: Never Used   • Alcohol use Yes     She's here with her     SURGICAL HISTORY  History reviewed. No pertinent surgical history.    CURRENT MEDICATIONS    I have reviewed the nurses notes and/or the list brought with the patient.    ALLERGIES  Allergies   Allergen Reactions   • Pcn [Penicillins] Hives   • Sulfa Drugs      Pt. States unsure " "of reactions and severity. Had the allergic reaction a while ago   • Tetracycline Hives       REVIEW OF SYSTEMS  See HPI for further details. Review of systems as above, otherwise all other systems are negative.     PHYSICAL EXAM  VITAL SIGNS: /72   Pulse 79   Temp 36.7 °C (98.1 °F)   Resp 18   Ht 1.626 m (5' 4\")   Wt 79.4 kg (175 lb)   SpO2 97%   BMI 30.04 kg/m²   Constitutional: Well appearing patient in no acute distress.  Not toxic, nor ill in appearance.  HENT: Mucus membranes moist.  Oropharynx is clear.  Eyes: Pupils equally round.  No scleral icterus.   Neck: Full nontender range of motion.  Lymphatic: No cervical lymphadenopathy noted.   Cardiovascular: Regular heart rate and rhythm.  No murmurs, rubs, nor gallop appreciated.   Thorax & Lungs: Chest is nontender.  Lungs are clear to auscultation with good air movement bilaterally.  No wheeze, rhonchi, nor rales.   Abdomen: Soft, with no tenderness, rebound nor guarding.  No mass, pulsatile mass, nor hepatosplenomegaly appreciated.  Skin: No purpura nor petechia noted.  Extremities/Musculoskeletal: No sign of trauma.  Calves are nontender with no cords nor edema.  No Catherine's sign.  Pulses are intact all around.   Neurologic: Alert & oriented.  Strength and sensation is intact all around.  Gait is normal.  Psychiatric: Normal affect appropriate for the clinical situation.    EKG #1  I interpreted this EKG myself.  This is a 12-lead study.  The rhythm is sinus with a rate of 80. There is inferior Q waves in leads 3 and aVF which are new from a tracing one year ago.  There are no ST segment nor T wave abnormalities.  Interpretation: No ST segment elevation myocardial infarction. New Inferior Q waves.    EKG #2  I interpreted this EKG myself.  This is a 12-lead study.  The rhythm isSinus with a rate of 74.  There are no ST segment nor T wave abnormalities.  Interpretation: No ST segment elevation myocardial infarction. No change in serial " cardiogram.    LABS  Labs Reviewed   CBC WITH DIFFERENTIAL - Abnormal; Notable for the following:        Result Value    Neutrophils-Polys 87.50 (*)     Lymphocytes 7.40 (*)     Neutrophils (Absolute) 9.16 (*)     Lymphs (Absolute) 0.77 (*)     All other components within normal limits    Narrative:     Indicate which anticoagulants the patient is on:->UNKNOWN   COMP METABOLIC PANEL - Abnormal; Notable for the following:     Potassium 3.5 (*)     Glucose 131 (*)     All other components within normal limits    Narrative:     Indicate which anticoagulants the patient is on:->UNKNOWN   BTYPE NATRIURETIC PEPTIDE    Narrative:     Indicate which anticoagulants the patient is on:->UNKNOWN   PROTHROMBIN TIME    Narrative:     Indicate which anticoagulants the patient is on:->UNKNOWN   APTT    Narrative:     Indicate which anticoagulants the patient is on:->UNKNOWN   LIPASE    Narrative:     Indicate which anticoagulants the patient is on:->UNKNOWN   TROPONIN    Narrative:     Indicate which anticoagulants the patient is on:->UNKNOWN   ESTIMATED GFR    Narrative:     Indicate which anticoagulants the patient is on:->UNKNOWN   TROPONIN         RADIOLOGY/PROCEDURES  I have reviewed the patient's film interpretations myself, and they are read out by the radiologist as:   DX-CHEST-PORTABLE (1 VIEW)   Final Result      Mild left midlung zone atelectasis and/or scarring        .    MEDICAL RECORD  I have reviewed patient's medical record and pertinent results are listed above.    COURSE & MEDICAL DECISION MAKING  I have reviewed any medical record information, laboratory studies and radiographic results as noted above.  Patient presents with vomiting and then subsequently developing chest pain. The quality of her symptoms is concerning for cardiac etiology. Her EKG does show some new inferior Q's. No ST segment changes. 1st troponin is negative. Chest x-ray shows no evidence of infiltrate, pneumomediastinum. At this point she is  already received aspirin. Her nausea is controlled with Zofran. I discussed the patient's case with Dr. Head, who I have asked to consult for further workup.     FINAL IMPRESSION  1. Precordial pain    2. Vomiting       This dictation was created using voice recognition software.    Electronically signed by: Billy Swanson, 12/25/2017 6:13 AM

## 2017-12-30 LAB — EKG IMPRESSION: NORMAL

## 2018-02-06 ENCOUNTER — HOSPITAL ENCOUNTER (OUTPATIENT)
Dept: HOSPITAL 8 - CFH | Age: 73
End: 2018-02-06
Attending: FAMILY MEDICINE
Payer: MEDICARE

## 2018-02-06 DIAGNOSIS — Z02.9: Primary | ICD-10-CM

## 2019-01-10 ENCOUNTER — HOSPITAL ENCOUNTER (OUTPATIENT)
Dept: HOSPITAL 8 - CFH | Age: 74
Discharge: HOME | End: 2019-01-10
Attending: FAMILY MEDICINE
Payer: MEDICARE

## 2019-01-10 DIAGNOSIS — Z12.31: Primary | ICD-10-CM

## 2019-01-10 PROCEDURE — 77063 BREAST TOMOSYNTHESIS BI: CPT

## 2020-01-16 ENCOUNTER — HOSPITAL ENCOUNTER (OUTPATIENT)
Dept: HOSPITAL 8 - CFH | Age: 75
Discharge: HOME | End: 2020-01-16
Attending: FAMILY MEDICINE
Payer: MEDICARE

## 2020-01-16 DIAGNOSIS — Z12.31: Primary | ICD-10-CM

## 2020-01-16 PROCEDURE — 77067 SCR MAMMO BI INCL CAD: CPT

## 2020-01-16 PROCEDURE — 77063 BREAST TOMOSYNTHESIS BI: CPT

## 2020-03-14 ENCOUNTER — HOSPITAL ENCOUNTER (EMERGENCY)
Dept: HOSPITAL 8 - ED | Age: 75
Discharge: HOME | End: 2020-03-14
Payer: MEDICARE

## 2020-03-14 VITALS — HEIGHT: 62 IN | BODY MASS INDEX: 32.46 KG/M2 | WEIGHT: 176.37 LBS

## 2020-03-14 VITALS — DIASTOLIC BLOOD PRESSURE: 77 MMHG | SYSTOLIC BLOOD PRESSURE: 144 MMHG

## 2020-03-14 DIAGNOSIS — R11.2: ICD-10-CM

## 2020-03-14 DIAGNOSIS — R51: ICD-10-CM

## 2020-03-14 DIAGNOSIS — R42: Primary | ICD-10-CM

## 2020-03-14 DIAGNOSIS — I10: ICD-10-CM

## 2020-03-14 LAB
ALBUMIN SERPL-MCNC: 3.6 G/DL (ref 3.4–5)
ALP SERPL-CCNC: 40 U/L (ref 45–117)
ALT SERPL-CCNC: 29 U/L (ref 12–78)
ANION GAP SERPL CALC-SCNC: 8 MMOL/L (ref 5–15)
BASOPHILS # BLD AUTO: 0.09 X10^3/UL (ref 0–0.1)
BASOPHILS NFR BLD AUTO: 1 % (ref 0–1)
BILIRUB SERPL-MCNC: 0.5 MG/DL (ref 0.2–1)
CALCIUM SERPL-MCNC: 8.8 MG/DL (ref 8.5–10.1)
CHLORIDE SERPL-SCNC: 107 MMOL/L (ref 98–107)
CREAT SERPL-MCNC: 1.02 MG/DL (ref 0.55–1.02)
EOSINOPHIL # BLD AUTO: 0.01 X10^3/UL (ref 0–0.4)
EOSINOPHIL NFR BLD AUTO: 0 % (ref 1–7)
ERYTHROCYTE [DISTWIDTH] IN BLOOD BY AUTOMATED COUNT: 13.6 % (ref 9.6–15.2)
LYMPHOCYTES # BLD AUTO: 1.92 X10^3/UL (ref 1–3.4)
LYMPHOCYTES NFR BLD AUTO: 26 % (ref 22–44)
MCH RBC QN AUTO: 30.6 PG (ref 27–34.8)
MCHC RBC AUTO-ENTMCNC: 33 G/DL (ref 32.4–35.8)
MCV RBC AUTO: 92.6 FL (ref 80–100)
MD: NO
MONOCYTES # BLD AUTO: 0.59 X10^3/UL (ref 0.2–0.8)
MONOCYTES NFR BLD AUTO: 8 % (ref 2–9)
NEUTROPHILS # BLD AUTO: 4.69 X10^3/UL (ref 1.8–6.8)
NEUTROPHILS NFR BLD AUTO: 64 % (ref 42–75)
PLATELET # BLD AUTO: 427 X10^3/UL (ref 130–400)
PMV BLD AUTO: 7.4 FL (ref 7.4–10.4)
PROT SERPL-MCNC: 7.4 G/DL (ref 6.4–8.2)
RBC # BLD AUTO: 4.82 X10^6/UL (ref 3.82–5.3)

## 2020-03-14 PROCEDURE — 36415 COLL VENOUS BLD VENIPUNCTURE: CPT

## 2020-03-14 PROCEDURE — 96360 HYDRATION IV INFUSION INIT: CPT

## 2020-03-14 PROCEDURE — 93005 ELECTROCARDIOGRAM TRACING: CPT

## 2020-03-14 PROCEDURE — 96361 HYDRATE IV INFUSION ADD-ON: CPT

## 2020-03-14 PROCEDURE — 85025 COMPLETE CBC W/AUTO DIFF WBC: CPT

## 2020-03-14 PROCEDURE — 80053 COMPREHEN METABOLIC PANEL: CPT

## 2020-03-14 PROCEDURE — 99285 EMERGENCY DEPT VISIT HI MDM: CPT

## 2020-03-14 PROCEDURE — 70450 CT HEAD/BRAIN W/O DYE: CPT

## 2020-03-14 PROCEDURE — 84443 ASSAY THYROID STIM HORMONE: CPT

## 2020-03-14 NOTE — NUR
PT BIB EMS FOR DIZZYNESS, [facility]IG[facility] STARTING THIS AM. PT STATES ROOM WAS [facility]. PT VSS, NOT IN RESP DISTRESS. CARDIAC MONITOR APPLIED. DENIES CP OR 
SOB. NO COUGH OR FEVER. NO RECENT TRAVEL

## 2021-01-14 DIAGNOSIS — Z23 NEED FOR VACCINATION: ICD-10-CM

## 2021-01-18 ENCOUNTER — HOSPITAL ENCOUNTER (OUTPATIENT)
Dept: HOSPITAL 8 - CFH | Age: 76
Discharge: HOME | End: 2021-01-18
Attending: FAMILY MEDICINE
Payer: MEDICARE

## 2021-01-18 DIAGNOSIS — Z12.31: Primary | ICD-10-CM

## 2021-01-18 PROCEDURE — 77067 SCR MAMMO BI INCL CAD: CPT

## 2021-01-18 PROCEDURE — 77063 BREAST TOMOSYNTHESIS BI: CPT

## 2021-01-22 ENCOUNTER — IMMUNIZATION (OUTPATIENT)
Dept: FAMILY PLANNING/WOMEN'S HEALTH CLINIC | Facility: IMMUNIZATION CENTER | Age: 76
End: 2021-01-22
Attending: INTERNAL MEDICINE
Payer: MEDICARE

## 2021-01-22 DIAGNOSIS — Z23 ENCOUNTER FOR VACCINATION: Primary | ICD-10-CM

## 2021-01-22 DIAGNOSIS — Z23 NEED FOR VACCINATION: ICD-10-CM

## 2021-01-22 PROCEDURE — 0001A PFIZER SARS-COV-2 VACCINE: CPT

## 2021-01-22 PROCEDURE — 91300 PFIZER SARS-COV-2 VACCINE: CPT

## 2021-02-12 ENCOUNTER — IMMUNIZATION (OUTPATIENT)
Dept: FAMILY PLANNING/WOMEN'S HEALTH CLINIC | Facility: IMMUNIZATION CENTER | Age: 76
End: 2021-02-12
Attending: INTERNAL MEDICINE
Payer: MEDICARE

## 2021-02-12 DIAGNOSIS — Z23 ENCOUNTER FOR VACCINATION: Primary | ICD-10-CM

## 2021-02-12 PROCEDURE — 91300 PFIZER SARS-COV-2 VACCINE: CPT | Performed by: INTERNAL MEDICINE

## 2021-02-12 PROCEDURE — 0002A PFIZER SARS-COV-2 VACCINE: CPT | Performed by: INTERNAL MEDICINE

## 2021-03-19 DIAGNOSIS — Z00.6 RESEARCH STUDY PATIENT: ICD-10-CM

## 2021-03-22 ENCOUNTER — HOSPITAL ENCOUNTER (OUTPATIENT)
Facility: MEDICAL CENTER | Age: 76
End: 2021-03-22
Attending: PATHOLOGY
Payer: COMMERCIAL

## 2021-03-23 DIAGNOSIS — Z00.6 RESEARCH STUDY PATIENT: ICD-10-CM

## 2021-04-02 LAB
ELF SCORE: 10.1
RELATIVE RISK: ABNORMAL
RISK GROUP: ABNORMAL
RISK: 23.6 %

## 2021-04-30 ENCOUNTER — HOSPITAL ENCOUNTER (OUTPATIENT)
Dept: HOSPITAL 8 - CFH | Age: 76
Discharge: HOME | End: 2021-04-30
Attending: FAMILY MEDICINE
Payer: MEDICARE

## 2021-04-30 DIAGNOSIS — C50.312: Primary | ICD-10-CM

## 2021-04-30 PROCEDURE — 76642 ULTRASOUND BREAST LIMITED: CPT

## 2021-04-30 PROCEDURE — G0279 TOMOSYNTHESIS, MAMMO: HCPCS

## 2021-04-30 PROCEDURE — 77061 BREAST TOMOSYNTHESIS UNI: CPT

## 2022-08-12 DIAGNOSIS — Z00.6 RESEARCH STUDY PATIENT: ICD-10-CM

## 2022-08-17 ENCOUNTER — HOSPITAL ENCOUNTER (OUTPATIENT)
Facility: MEDICAL CENTER | Age: 77
End: 2022-08-17
Attending: PATHOLOGY
Payer: COMMERCIAL

## 2022-08-17 DIAGNOSIS — Z00.6 RESEARCH STUDY PATIENT: ICD-10-CM

## 2022-08-25 LAB
ELF SCORE: 10
RELATIVE RISK: ABNORMAL
RISK GROUP: ABNORMAL
RISK: 23.6 %

## 2023-11-29 ENCOUNTER — PATIENT MESSAGE (OUTPATIENT)
Dept: HEALTH INFORMATION MANAGEMENT | Facility: OTHER | Age: 78
End: 2023-11-29

## 2025-04-29 ENCOUNTER — APPOINTMENT (OUTPATIENT)
Dept: URBAN - METROPOLITAN AREA CLINIC 22 | Facility: CLINIC | Age: 80
Setting detail: DERMATOLOGY
End: 2025-04-29

## 2025-04-29 DIAGNOSIS — D22 MELANOCYTIC NEVI: ICD-10-CM

## 2025-04-29 DIAGNOSIS — Z86.006 PERSONAL HISTORY OF MELANOMA IN-SITU: ICD-10-CM

## 2025-04-29 DIAGNOSIS — L81.4 OTHER MELANIN HYPERPIGMENTATION: ICD-10-CM

## 2025-04-29 DIAGNOSIS — L82.1 OTHER SEBORRHEIC KERATOSIS: ICD-10-CM

## 2025-04-29 PROBLEM — D22.5 MELANOCYTIC NEVI OF TRUNK: Status: ACTIVE | Noted: 2025-04-29

## 2025-04-29 PROCEDURE — 99203 OFFICE O/P NEW LOW 30 MIN: CPT

## 2025-04-29 PROCEDURE — ? COUNSELING

## 2025-04-29 PROCEDURE — ? SUNSCREEN TREATMENT REGIMEN

## 2025-04-29 ASSESSMENT — LOCATION SIMPLE DESCRIPTION DERM
LOCATION SIMPLE: LEFT BUTTOCK
LOCATION SIMPLE: RIGHT WRIST
LOCATION SIMPLE: LEFT UPPER BACK
LOCATION SIMPLE: LEFT FOREARM
LOCATION SIMPLE: INFERIOR FOREHEAD
LOCATION SIMPLE: RIGHT FOREARM
LOCATION SIMPLE: GROIN
LOCATION SIMPLE: UPPER BACK
LOCATION SIMPLE: CHEST

## 2025-04-29 ASSESSMENT — LOCATION DETAILED DESCRIPTION DERM
LOCATION DETAILED: LEFT SUPRAPUBIC SKIN
LOCATION DETAILED: UPPER STERNUM
LOCATION DETAILED: INFERIOR THORACIC SPINE
LOCATION DETAILED: RIGHT DORSAL WRIST
LOCATION DETAILED: LEFT VENTRAL PROXIMAL FOREARM
LOCATION DETAILED: STERNAL NOTCH
LOCATION DETAILED: LEFT BUTTOCK
LOCATION DETAILED: LEFT SUPERIOR MEDIAL UPPER BACK
LOCATION DETAILED: RIGHT VENTRAL PROXIMAL FOREARM
LOCATION DETAILED: INFERIOR MID FOREHEAD

## 2025-04-29 ASSESSMENT — LOCATION ZONE DERM
LOCATION ZONE: FACE
LOCATION ZONE: ARM
LOCATION ZONE: TRUNK

## 2025-07-22 ENCOUNTER — APPOINTMENT (OUTPATIENT)
Dept: URBAN - METROPOLITAN AREA CLINIC 22 | Facility: CLINIC | Age: 80
Setting detail: DERMATOLOGY
End: 2025-07-22

## 2025-07-22 DIAGNOSIS — Z86.007 PERSONAL HISTORY OF IN-SITU NEOPLASM OF SKIN: ICD-10-CM

## 2025-07-22 DIAGNOSIS — L82.1 OTHER SEBORRHEIC KERATOSIS: ICD-10-CM

## 2025-07-22 DIAGNOSIS — D18.0 HEMANGIOMA: ICD-10-CM

## 2025-07-22 DIAGNOSIS — L81.4 OTHER MELANIN HYPERPIGMENTATION: ICD-10-CM

## 2025-07-22 DIAGNOSIS — Z86.006 PERSONAL HISTORY OF MELANOMA IN-SITU: ICD-10-CM

## 2025-07-22 DIAGNOSIS — S31000A OPEN WOUND(S) (MULTIPLE) OF UNSPECIFIED SITE(S), WITHOUT MENTION OF COMPLICATION: ICD-10-CM

## 2025-07-22 DIAGNOSIS — D22 MELANOCYTIC NEVI: ICD-10-CM

## 2025-07-22 DIAGNOSIS — L57.0 ACTINIC KERATOSIS: ICD-10-CM

## 2025-07-22 PROBLEM — D22.5 MELANOCYTIC NEVI OF TRUNK: Status: ACTIVE | Noted: 2025-07-22

## 2025-07-22 PROBLEM — D18.01 HEMANGIOMA OF SKIN AND SUBCUTANEOUS TISSUE: Status: ACTIVE | Noted: 2025-07-22

## 2025-07-22 PROBLEM — D48.5 NEOPLASM OF UNCERTAIN BEHAVIOR OF SKIN: Status: ACTIVE | Noted: 2025-07-22

## 2025-07-22 PROBLEM — S81.802A UNSPECIFIED OPEN WOUND, LEFT LOWER LEG, INITIAL ENCOUNTER: Status: ACTIVE | Noted: 2025-07-22

## 2025-07-22 PROCEDURE — ? LIQUID NITROGEN

## 2025-07-22 PROCEDURE — ? DIAGNOSIS COMMENT

## 2025-07-22 PROCEDURE — ? COUNSELING

## 2025-07-22 PROCEDURE — ? SUNSCREEN RECOMMENDATIONS

## 2025-07-22 PROCEDURE — ? BIOPSY BY SHAVE METHOD

## 2025-07-22 ASSESSMENT — LOCATION ZONE DERM
LOCATION ZONE: NOSE
LOCATION ZONE: TRUNK
LOCATION ZONE: ARM
LOCATION ZONE: LEG
LOCATION ZONE: FACE

## 2025-07-22 ASSESSMENT — LOCATION DETAILED DESCRIPTION DERM
LOCATION DETAILED: RIGHT VENTRAL PROXIMAL FOREARM
LOCATION DETAILED: RIGHT DORSAL WRIST
LOCATION DETAILED: INFERIOR THORACIC SPINE
LOCATION DETAILED: SUPERIOR LUMBAR SPINE
LOCATION DETAILED: RIGHT ANTERIOR PROXIMAL THIGH
LOCATION DETAILED: LEFT ANTERIOR DISTAL THIGH
LOCATION DETAILED: MIDDLE STERNUM
LOCATION DETAILED: EPIGASTRIC SKIN
LOCATION DETAILED: NASAL DORSUM
LOCATION DETAILED: INFERIOR MID FOREHEAD
LOCATION DETAILED: STERNAL NOTCH
LOCATION DETAILED: LEFT POSTERIOR SHOULDER
LOCATION DETAILED: LEFT LATERAL SUPERIOR CHEST
LOCATION DETAILED: LEFT VENTRAL PROXIMAL FOREARM
LOCATION DETAILED: LEFT DISTAL PRETIBIAL REGION
LOCATION DETAILED: LEFT BUTTOCK

## 2025-07-22 ASSESSMENT — LOCATION SIMPLE DESCRIPTION DERM
LOCATION SIMPLE: LEFT FOREARM
LOCATION SIMPLE: LEFT THIGH
LOCATION SIMPLE: RIGHT WRIST
LOCATION SIMPLE: LEFT PRETIBIAL REGION
LOCATION SIMPLE: NOSE
LOCATION SIMPLE: LEFT SHOULDER
LOCATION SIMPLE: INFERIOR FOREHEAD
LOCATION SIMPLE: UPPER BACK
LOCATION SIMPLE: RIGHT FOREARM
LOCATION SIMPLE: RIGHT THIGH
LOCATION SIMPLE: CHEST
LOCATION SIMPLE: LEFT BUTTOCK
LOCATION SIMPLE: LOWER BACK
LOCATION SIMPLE: ABDOMEN